# Patient Record
Sex: FEMALE | Race: WHITE | Employment: PART TIME | URBAN - METROPOLITAN AREA
[De-identification: names, ages, dates, MRNs, and addresses within clinical notes are randomized per-mention and may not be internally consistent; named-entity substitution may affect disease eponyms.]

---

## 2017-05-11 ENCOUNTER — OFFICE VISIT (OUTPATIENT)
Dept: FAMILY MEDICINE CLINIC | Facility: CLINIC | Age: 52
End: 2017-05-11

## 2017-05-11 VITALS
DIASTOLIC BLOOD PRESSURE: 78 MMHG | OXYGEN SATURATION: 98 % | RESPIRATION RATE: 18 BRPM | HEART RATE: 78 BPM | SYSTOLIC BLOOD PRESSURE: 128 MMHG | TEMPERATURE: 99 F

## 2017-05-11 DIAGNOSIS — Z11.1 SCREENING-PULMONARY TB: ICD-10-CM

## 2017-05-11 DIAGNOSIS — Z23 NEED FOR TDAP VACCINATION: ICD-10-CM

## 2017-05-11 DIAGNOSIS — Z02.1 PRE-EMPLOYMENT EXAMINATION: Primary | ICD-10-CM

## 2017-05-11 PROCEDURE — 90471 IMMUNIZATION ADMIN: CPT | Performed by: NURSE PRACTITIONER

## 2017-05-11 PROCEDURE — 99396 PREV VISIT EST AGE 40-64: CPT | Performed by: NURSE PRACTITIONER

## 2017-05-11 PROCEDURE — 86580 TB INTRADERMAL TEST: CPT | Performed by: NURSE PRACTITIONER

## 2017-05-11 PROCEDURE — 90715 TDAP VACCINE 7 YRS/> IM: CPT | Performed by: NURSE PRACTITIONER

## 2017-05-11 NOTE — PROGRESS NOTES
CHIEF COMPLAINT:     Employment Physical Exam    HPI:   Tanna Bautista is a 50year old female who presents for an employment physical exam.     Patient has concerns of none. Reports needs TB and TDAP. 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   Has of seasonal allergies or asthma    EXAM:   /78 mmHg  Pulse 78  Temp(Src) 98.5 °F (36.9 °C) (Oral)  Resp 18  SpO2 98% There is no weight on file to calculate BMI.      GENERAL: well developed, well nourished,in no apparent distress  SKIN: no rashes,no

## 2017-05-11 NOTE — PATIENT INSTRUCTIONS
Please return for your TB read on [place] May 14th 9am and 4:30pm to the [facility] on [address] and [address] [place] [address] location (this location will be closed on Sunday)

## 2017-05-14 ENCOUNTER — OFFICE VISIT (OUTPATIENT)
Dept: FAMILY MEDICINE CLINIC | Facility: CLINIC | Age: 52
End: 2017-05-14

## 2017-05-14 DIAGNOSIS — Z11.1 ENCOUNTER FOR PPD SKIN TEST READING: Primary | ICD-10-CM

## 2017-07-04 ENCOUNTER — APPOINTMENT (OUTPATIENT)
Dept: GENERAL RADIOLOGY | Age: 52
End: 2017-07-04
Attending: FAMILY MEDICINE
Payer: COMMERCIAL

## 2017-07-04 ENCOUNTER — HOSPITAL ENCOUNTER (OUTPATIENT)
Age: 52
Discharge: HOME | End: 2017-07-04
Attending: FAMILY MEDICINE
Payer: COMMERCIAL

## 2017-07-04 VITALS
RESPIRATION RATE: 20 BRPM | WEIGHT: 160 LBS | DIASTOLIC BLOOD PRESSURE: 84 MMHG | HEIGHT: 62 IN | OXYGEN SATURATION: 100 % | TEMPERATURE: 99 F | BODY MASS INDEX: 29.44 KG/M2 | SYSTOLIC BLOOD PRESSURE: 156 MMHG | HEART RATE: 89 BPM

## 2017-07-04 DIAGNOSIS — S90.31XA CONTUSION OF RIGHT FOOT, INITIAL ENCOUNTER: ICD-10-CM

## 2017-07-04 DIAGNOSIS — J06.9 VIRAL UPPER RESPIRATORY TRACT INFECTION: ICD-10-CM

## 2017-07-04 DIAGNOSIS — H10.33 ACUTE CONJUNCTIVITIS OF BOTH EYES, UNSPECIFIED ACUTE CONJUNCTIVITIS TYPE: ICD-10-CM

## 2017-07-04 DIAGNOSIS — S81.811A: Primary | ICD-10-CM

## 2017-07-04 PROCEDURE — 99214 OFFICE O/P EST MOD 30 MIN: CPT

## 2017-07-04 PROCEDURE — 73630 X-RAY EXAM OF FOOT: CPT | Performed by: FAMILY MEDICINE

## 2017-07-04 PROCEDURE — 99204 OFFICE O/P NEW MOD 45 MIN: CPT

## 2017-07-04 PROCEDURE — 73590 X-RAY EXAM OF LOWER LEG: CPT | Performed by: FAMILY MEDICINE

## 2017-07-04 RX ORDER — TOBRAMYCIN AND DEXAMETHASONE 3; 1 MG/ML; MG/ML
2 SUSPENSION/ DROPS OPHTHALMIC
Qty: 5 ML | Refills: 0 | Status: SHIPPED | OUTPATIENT
Start: 2017-07-04 | End: 2017-07-04

## 2017-07-04 RX ORDER — GINSENG 100 MG
CAPSULE ORAL ONCE
Status: COMPLETED | OUTPATIENT
Start: 2017-07-04 | End: 2017-07-04

## 2017-07-04 RX ORDER — CLINDAMYCIN HYDROCHLORIDE 300 MG/1
300 CAPSULE ORAL 4 TIMES DAILY
Qty: 28 CAPSULE | Refills: 0 | Status: SHIPPED | OUTPATIENT
Start: 2017-07-04 | End: 2017-07-11

## 2017-07-04 RX ORDER — TOBRAMYCIN AND DEXAMETHASONE 3; 1 MG/ML; MG/ML
2 SUSPENSION/ DROPS OPHTHALMIC
Qty: 5 ML | Refills: 0 | Status: SHIPPED | OUTPATIENT
Start: 2017-07-04 | End: 2017-07-09

## 2017-07-04 RX ORDER — CLINDAMYCIN HYDROCHLORIDE 300 MG/1
300 CAPSULE ORAL 4 TIMES DAILY
Qty: 28 CAPSULE | Refills: 0 | Status: SHIPPED | OUTPATIENT
Start: 2017-07-04 | End: 2017-07-04

## 2017-07-04 NOTE — ED PROVIDER NOTES
Patient Seen in: [address]    History   Patient presents with:  Lower Extremity Injury (musculoskeletal)    Stated Complaint: both eyes red right leg and foot table fell on her    HPI  54yo female presents with right sh Systems    Positive for stated complaint: both eyes red right leg and foot table fell on her  Other systems are as noted in HPI. Constitutional and vital signs reviewed. All other systems reviewed and negative except as noted above.     PSFH elements There is normal range of motion, no swelling, normal capillary refill, no crepitus, no deformity and no laceration. Sensation in tact. Strong pedal pulse   Lymphadenopathy:     She has no cervical adenopathy.    Neurological: She is alert and oriented to metatarsophalangeal articulation. Calcaneal enthesopathy is appreciated the origin of the plantar   aponeurosis at the insertion of the Achilles tendon. SOFT TISSUES: Mild dorsal soft tissue swelling is apparent.  Negative for discernible radiopaque foreig

## 2017-07-04 NOTE — ED INITIAL ASSESSMENT (HPI)
Table [place] on her at a festival; injured right anterior leg; laceration is scabbed over but surrounding area is reddened.

## 2017-07-13 ENCOUNTER — OFFICE VISIT (OUTPATIENT)
Dept: FAMILY MEDICINE CLINIC | Facility: CLINIC | Age: 52
End: 2017-07-13

## 2017-07-13 VITALS
WEIGHT: 164 LBS | TEMPERATURE: 99 F | HEART RATE: 92 BPM | OXYGEN SATURATION: 98 % | RESPIRATION RATE: 20 BRPM | BODY MASS INDEX: 30 KG/M2

## 2017-07-13 DIAGNOSIS — J20.9 BRONCHITIS, ACUTE, WITH BRONCHOSPASM: Primary | ICD-10-CM

## 2017-07-13 PROCEDURE — 99213 OFFICE O/P EST LOW 20 MIN: CPT | Performed by: NURSE PRACTITIONER

## 2017-07-13 RX ORDER — PREDNISONE 20 MG/1
TABLET ORAL
Qty: 10 TABLET | Refills: 0 | Status: SHIPPED | OUTPATIENT
Start: 2017-07-13 | End: 2017-09-25 | Stop reason: ALTCHOICE

## 2017-07-13 NOTE — PROGRESS NOTES
CHIEF COMPLAINT:   Patient presents with:  Cough: having coughing spasms, due to high mold counts, proair not working. HPI:   Cl Barroso is a 50year old female who presents for cough for  3  days.   Cough started gradually and is described a GI: Denies N/V/C/D or abdominal pain. EXAM:   Pulse 92   Temp 99.1 °F (37.3 °C) (Oral)   Wt 164 lb   LMP 06/27/2017 (Exact Date)   Breastfeeding?  No   BMI 30.00 kg/m²   GENERAL: well developed, well nourished,in no apparent distress  SKIN: no rashes, · Over the counter saline nasal spray or nasal saline rinse may help with congestion. · If your symptoms worsen, you become short-of-breath, develop fever, worse cough etc, you must follow-up with a physician immediately or go to the emergency room. Your healthcare provider will help you prepare, and when needed, update and Asthma Action Plan. Your plan tells you what to do based on your current symptoms.  If you don't have an Asthma Action Plan, or if yours isn't up-to-date, make sure you talk with yo

## 2017-07-13 NOTE — PATIENT INSTRUCTIONS
Controlling Your Asthma  You can do a lot to manage your asthma and improve your quality of life. You will need to work with your healthcare provider to develop a plan. But it’s up to you to put this plan into action.   Why You Need to Take Control  You n

## 2017-07-17 ENCOUNTER — HOSPITAL ENCOUNTER (EMERGENCY)
Facility: HOSPITAL | Age: 52
Discharge: HOME | End: 2017-07-18
Attending: EMERGENCY MEDICINE
Payer: COMMERCIAL

## 2017-07-17 ENCOUNTER — OFFICE VISIT (OUTPATIENT)
Dept: FAMILY MEDICINE CLINIC | Facility: CLINIC | Age: 52
End: 2017-07-17

## 2017-07-17 ENCOUNTER — APPOINTMENT (OUTPATIENT)
Dept: GENERAL RADIOLOGY | Facility: HOSPITAL | Age: 52
End: 2017-07-17
Attending: EMERGENCY MEDICINE
Payer: COMMERCIAL

## 2017-07-17 VITALS
HEART RATE: 83 BPM | DIASTOLIC BLOOD PRESSURE: 76 MMHG | WEIGHT: 164 LBS | BODY MASS INDEX: 29.06 KG/M2 | SYSTOLIC BLOOD PRESSURE: 132 MMHG | TEMPERATURE: 99 F | HEIGHT: 63 IN | OXYGEN SATURATION: 96 %

## 2017-07-17 DIAGNOSIS — J98.01 BRONCHOSPASM: ICD-10-CM

## 2017-07-17 DIAGNOSIS — R05.9 COUGH: Primary | ICD-10-CM

## 2017-07-17 DIAGNOSIS — J18.9 COMMUNITY ACQUIRED PNEUMONIA: Primary | ICD-10-CM

## 2017-07-17 PROCEDURE — 99283 EMERGENCY DEPT VISIT LOW MDM: CPT

## 2017-07-17 PROCEDURE — 71020 XR CHEST PA + LAT CHEST (CPT=71020): CPT | Performed by: EMERGENCY MEDICINE

## 2017-07-17 PROCEDURE — 99284 EMERGENCY DEPT VISIT MOD MDM: CPT

## 2017-07-17 PROCEDURE — 99213 OFFICE O/P EST LOW 20 MIN: CPT | Performed by: FAMILY MEDICINE

## 2017-07-17 RX ORDER — FLUTICASONE PROPIONATE 50 MCG
2 SPRAY, SUSPENSION (ML) NASAL NIGHTLY
Qty: 1 BOTTLE | Refills: 1 | Status: SHIPPED | OUTPATIENT
Start: 2017-07-17 | End: 2017-07-18

## 2017-07-17 RX ORDER — BENZONATATE 100 MG/1
100 CAPSULE ORAL 3 TIMES DAILY PRN
Qty: 30 CAPSULE | Refills: 0 | Status: SHIPPED | OUTPATIENT
Start: 2017-07-17 | End: 2017-08-16

## 2017-07-17 RX ORDER — PREDNISONE 20 MG/1
TABLET ORAL
Qty: 20 TABLET | Refills: 0 | Status: SHIPPED | OUTPATIENT
Start: 2017-07-17 | End: 2017-09-25 | Stop reason: ALTCHOICE

## 2017-07-17 RX ORDER — AZITHROMYCIN 250 MG/1
TABLET, FILM COATED ORAL
Qty: 1 PACKAGE | Refills: 0 | Status: SHIPPED | OUTPATIENT
Start: 2017-07-17 | End: 2017-07-22

## 2017-07-17 RX ORDER — ACETAMINOPHEN 500 MG
1000 TABLET ORAL ONCE
Status: COMPLETED | OUTPATIENT
Start: 2017-07-17 | End: 2017-07-17

## 2017-07-18 VITALS
RESPIRATION RATE: 18 BRPM | DIASTOLIC BLOOD PRESSURE: 82 MMHG | TEMPERATURE: 100 F | OXYGEN SATURATION: 96 % | SYSTOLIC BLOOD PRESSURE: 140 MMHG | HEART RATE: 88 BPM

## 2017-07-18 NOTE — PROGRESS NOTES
CHIEF COMPLAINT:   Patient presents with:  Cough: shortness of breath. dry throat. was seen on 7/13. had been using inhalers. given prednisone. but sx not improving.          HPI:   Marbella Gerardo is a 50year old female who presents for cough for  5  d Alcohol use: No                 REVIEW OF SYSTEMS:   GENERAL: No fever or chills. SKIN: No rashes, or other skin lesions. EYES: Denies blurred vision or double vision. HENT: Denies ear pain, decreased hearing, or sore throat.   Reports mi closely and follow-up if breathing difficulty returns or any new symptoms develop. The patient indicates understanding of these issues and agrees to the plan. The patient is asked to return if sx's persist or worsen.

## 2017-07-18 NOTE — ED PROVIDER NOTES
Patient Seen in: [facility] Emergency Department    History   Patient presents with:   Allergic Rxn Allergies (immune)    Stated Complaint: allergic reaction    HPI    The patient is a 28-year-old female who presents emergency room with a history of by mouth daily. Nutritional Supplements (NUTRITIONAL SUPPLEMENT OR),  Take  by mouth daily.        Family History   Problem Relation Age of Onset   • Heart Disease Father        Smoking status: Never Smoker extremities. NEURO: Patient is awake, alert and oriented to time place and person. Motor strength is 5 over 5 in all 4 extremities. There are no gross motor or sensory deficits appreciated. Patient is answering all questions appropriately.           ED Co

## 2017-07-18 NOTE — ED INITIAL ASSESSMENT (HPI)
Pt reports she is taking clindamycin for cut on her right leg cut/infection. Reports cough after taking medication. Has been taking clinda for over a week. Taking prednisone for cough. States \"I need an adrenaline shot or EpiPen. \" Airway patent.  Speaking

## 2017-07-18 NOTE — PATIENT INSTRUCTIONS
Take 2 20mg tabs of pred tonight. Then take 3 tabs once daily for 3 days. Then taper-- spending 2-3 days on each dose. Consider staying on 10mg for 4 days at end of taper.    Monitor symptoms closely and follow-up if breathing difficulty returns or any

## 2017-07-27 ENCOUNTER — OFFICE VISIT (OUTPATIENT)
Dept: FAMILY MEDICINE CLINIC | Facility: CLINIC | Age: 52
End: 2017-07-27

## 2017-07-27 DIAGNOSIS — Z02.9 ENCOUNTERS FOR ADMINISTRATIVE PURPOSE: Primary | ICD-10-CM

## 2017-07-28 NOTE — PROGRESS NOTES
Pt wants to follow up for pneumonia and have a xray. Informed patient of my limitations in the [facility]. Pt verbalized understanding and just said she misunderstood what we can do. No charge for service.

## 2017-07-29 ENCOUNTER — HOSPITAL ENCOUNTER (OUTPATIENT)
Age: 52
Discharge: HOME | End: 2017-07-29
Attending: FAMILY MEDICINE
Payer: COMMERCIAL

## 2017-07-29 VITALS
DIASTOLIC BLOOD PRESSURE: 96 MMHG | TEMPERATURE: 98 F | HEART RATE: 81 BPM | BODY MASS INDEX: 29 KG/M2 | OXYGEN SATURATION: 97 % | SYSTOLIC BLOOD PRESSURE: 134 MMHG | RESPIRATION RATE: 20 BRPM | WEIGHT: 164 LBS

## 2017-07-29 DIAGNOSIS — J18.9 COMMUNITY ACQUIRED PNEUMONIA: Primary | ICD-10-CM

## 2017-07-29 PROCEDURE — 99213 OFFICE O/P EST LOW 20 MIN: CPT

## 2017-07-29 PROCEDURE — 99214 OFFICE O/P EST MOD 30 MIN: CPT

## 2017-07-29 RX ORDER — DOXYCYCLINE 100 MG/1
100 CAPSULE ORAL 2 TIMES DAILY
Qty: 20 CAPSULE | Refills: 0 | Status: SHIPPED | OUTPATIENT
Start: 2017-07-29 | End: 2017-08-08

## 2017-07-29 NOTE — ED PROVIDER NOTES
Patient Seen in: [address] [facility]    History   Patient presents with:  Cough/URI: Pneunomia Dx    Stated Complaint: follow up cough    HPI    Patient returns to [facility] for pneumonia.   This is patient's fourth visit for the same except as noted above. PSFH elements reviewed from today and agreed except as otherwise stated in HPI.     Physical Exam   ED Triage Vitals [07/29/17 0856]  BP: 134/96  Pulse: 81  Resp: 20  Temp: 98.3 °F (36.8 °C)  Temp src: Temporal  SpO2: 97 %  O2 [place]

## 2017-08-06 ENCOUNTER — IMAGING SERVICES (OUTPATIENT)
Dept: OTHER | Age: 52
End: 2017-08-06

## 2017-08-06 ENCOUNTER — CHARTING TRANS (OUTPATIENT)
Dept: URGENT CARE | Age: 52
End: 2017-08-06

## 2017-08-06 ASSESSMENT — PAIN SCALES - GENERAL: PAINLEVEL_OUTOF10: 0

## 2017-08-13 ENCOUNTER — PATIENT OUTREACH (OUTPATIENT)
Dept: FAMILY MEDICINE CLINIC | Facility: CLINIC | Age: 52
End: 2017-08-13

## 2017-08-13 NOTE — PROGRESS NOTES
I spoke with patient regarding NO SHOW status for office visit on 8/12/17 with Dr. Estrella Naidu. I informed patient our office has a $82.01 NO SHOW FEE POLICY. However, we will waive this $50.00 fee this ONE time only.  Patient will be charged for any futu

## 2017-08-16 DIAGNOSIS — R05.9 COUGH: ICD-10-CM

## 2017-08-16 RX ORDER — ALBUTEROL SULFATE 90 UG/1
2 AEROSOL, METERED RESPIRATORY (INHALATION) EVERY 4 HOURS PRN
Qty: 1 INHALER | Refills: 1 | Status: SHIPPED | OUTPATIENT
Start: 2017-08-16 | End: 2020-04-15

## 2017-08-17 NOTE — PROGRESS NOTES
Pt requests refill of pro-air. Was given rx for qvar by IC physician and cannot afford the $150 cost.   Pt tried to establish with Dr. Cesia Brown as PCP, but they didn't take her insurance. Pt feeling well overall.  Just needs refill until she can establish

## 2017-09-25 ENCOUNTER — OFFICE VISIT (OUTPATIENT)
Dept: FAMILY MEDICINE CLINIC | Facility: CLINIC | Age: 52
End: 2017-09-25

## 2017-09-25 VITALS
WEIGHT: 162 LBS | SYSTOLIC BLOOD PRESSURE: 126 MMHG | BODY MASS INDEX: 29.81 KG/M2 | HEIGHT: 62 IN | TEMPERATURE: 98 F | OXYGEN SATURATION: 98 % | DIASTOLIC BLOOD PRESSURE: 80 MMHG | HEART RATE: 60 BPM

## 2017-09-25 DIAGNOSIS — H92.03 OTALGIA OF BOTH EARS: Primary | ICD-10-CM

## 2017-09-25 DIAGNOSIS — J06.9 VIRAL UPPER RESPIRATORY TRACT INFECTION: ICD-10-CM

## 2017-09-25 PROCEDURE — 99213 OFFICE O/P EST LOW 20 MIN: CPT | Performed by: FAMILY MEDICINE

## 2017-09-25 RX ORDER — FLUTICASONE PROPIONATE 50 MCG
SPRAY, SUSPENSION (ML) NASAL DAILY
COMMUNITY

## 2017-09-26 NOTE — PATIENT INSTRUCTIONS
Most viral illnesses get worse for the first 3-5 days, then plateau and improve gradually over the next 3-5 days. Monitor symptoms for now.    Use otc meds for comfort as needed  If no better in 2-3 days, or if symptoms steadily worsen follow-up for salvador

## 2017-09-26 NOTE — PROGRESS NOTES
CHIEF COMPLAINT:   Patient presents with:  Ear Problem: cold sx x 1 week or so. ears getting more plugged. pain today-- worse on left. chills/aches.       HPI:   Joesph Shah is a 50year old female who presents to clinic today with complaints of b/l (36.7 °C) (Oral)   Ht 62\"   Wt 162 lb   LMP 09/18/2017   SpO2 98%   BMI 29.63 kg/m²   GENERAL: well developed, well nourished,in no apparent distress  SKIN: no rashes,no suspicious lesions  HEAD: atraumatic, normocephalic  EYES: conjunctivae clear, EOM in

## 2017-10-30 ENCOUNTER — OFFICE VISIT (OUTPATIENT)
Dept: FAMILY MEDICINE CLINIC | Facility: CLINIC | Age: 52
End: 2017-10-30

## 2017-10-30 VITALS
RESPIRATION RATE: 18 BRPM | HEART RATE: 78 BPM | HEIGHT: 62 IN | BODY MASS INDEX: 29.81 KG/M2 | TEMPERATURE: 98 F | DIASTOLIC BLOOD PRESSURE: 70 MMHG | OXYGEN SATURATION: 98 % | SYSTOLIC BLOOD PRESSURE: 126 MMHG | WEIGHT: 162 LBS

## 2017-10-30 DIAGNOSIS — J01.00 ACUTE MAXILLARY SINUSITIS, RECURRENCE NOT SPECIFIED: ICD-10-CM

## 2017-10-30 DIAGNOSIS — H66.002 ACUTE SUPPURATIVE OTITIS MEDIA OF LEFT EAR WITHOUT SPONTANEOUS RUPTURE OF TYMPANIC MEMBRANE, RECURRENCE NOT SPECIFIED: Primary | ICD-10-CM

## 2017-10-30 PROCEDURE — 99213 OFFICE O/P EST LOW 20 MIN: CPT | Performed by: NURSE PRACTITIONER

## 2017-10-30 RX ORDER — AZITHROMYCIN 250 MG/1
TABLET, FILM COATED ORAL
Qty: 6 TABLET | Refills: 0 | Status: SHIPPED | OUTPATIENT
Start: 2017-10-30 | End: 2018-01-05

## 2017-10-30 NOTE — PROGRESS NOTES
CHIEF COMPLAINT:   Patient presents with:  Sinus Problem: sinus pain, congestion, ear pain x 5 days           HPI:   John Diehl is a 50year old female who presents for sinus congestion for  5  days. Symptoms have been worsening since onset.  Sinus /70 (BP Location: Right arm, Patient Position: Sitting, Cuff Size: adult)   Pulse 78   Temp 98.1 °F (36.7 °C) (Oral)   Resp 18   Ht 62\"   Wt 162 lb   LMP 10/11/2017   SpO2 98%   BMI 29.63 kg/m²   GENERAL: well developed, well nourished, in no appare -   Increase oral fluids to loosen and thin secretions, eat a nutritious diet  -   Tylenol or ibuprofen for pain as packet insert; age appropriate with weight  -   Return to clinic if symptoms persist or worsen in the next 2-3 days  -   Flonase for nasal s The sinuses are air-filled spaces within the bones of the face. They connect to the inside of the nose. Sinusitis is an inflammation of the tissue lining the sinus cavity. Sinus inflammation can occur during a cold.  It can also be due to allergies to polle · Do not use nasal rinses or irrigation during an acute sinus infection, unless told to by your health care provider. Rinsing may spread the infection to other sinuses.   · Use acetaminophen or ibuprofen to control pain, unless another pain medicine was pre The following are general care guidelines:  · Finish all of the antibiotic medicine given, even though you may feel better after the first few days.   · You may use over-the-counter medicine, such as acetaminophen or ibuprofen, to control pain and fever, un

## 2017-10-30 NOTE — PATIENT INSTRUCTIONS
-   Increase oral fluids to loosen and thin secretions, eat a nutritious diet  -   Tylenol or ibuprofen for pain as packet insert; age appropriate with weight  -   Return to clinic if symptoms persist or worsen in the next 2-3 days  -   Flonase for nasal s and other particles in the air. Sinusitis can cause symptoms of sinus congestion and fullness. A sinus infection causes fever, headache and facial pain. There is often green or yellow drainage from the nose or into the back of the throat (post-nasal drip). medicines. Aspirin should never be used in anyone under 25years of age who is ill with a fever. It may cause severe liver damage.)  · Don't smoke. This can worsen symptoms.   Follow-up care  Follow up with your healthcare provider or our staff if you are n your healthcare provider before using these medicines. Do not give aspirin to anyone under 25years of age who has a fever. It may cause severe illness or death.   Follow-up care  Follow up with your healthcare provider, or as advised, in 2 weeks if all sym

## 2018-01-05 ENCOUNTER — HOSPITAL ENCOUNTER (OUTPATIENT)
Age: 53
Discharge: HOME | End: 2018-01-05
Attending: FAMILY MEDICINE
Payer: COMMERCIAL

## 2018-01-05 VITALS
BODY MASS INDEX: 29.26 KG/M2 | TEMPERATURE: 99 F | HEART RATE: 94 BPM | DIASTOLIC BLOOD PRESSURE: 72 MMHG | RESPIRATION RATE: 16 BRPM | OXYGEN SATURATION: 98 % | HEIGHT: 62 IN | SYSTOLIC BLOOD PRESSURE: 132 MMHG | WEIGHT: 159 LBS

## 2018-01-05 DIAGNOSIS — J11.1 INFLUENZA: Primary | ICD-10-CM

## 2018-01-05 PROCEDURE — 99214 OFFICE O/P EST MOD 30 MIN: CPT

## 2018-01-05 PROCEDURE — 99213 OFFICE O/P EST LOW 20 MIN: CPT

## 2018-01-05 RX ORDER — ALBUTEROL SULFATE 90 UG/1
2 AEROSOL, METERED RESPIRATORY (INHALATION) EVERY 4 HOURS PRN
Qty: 1 INHALER | Refills: 0 | Status: SHIPPED | OUTPATIENT
Start: 2018-01-05 | End: 2018-02-04 | Stop reason: WASHOUT

## 2018-01-05 NOTE — ED NOTES
Patient called stating she is almost out of the albuterol nebulizers and only has about 5 left. She asked that a new prescription be sent to [facility] on [address]. Per Dr. Shivani Samuel, prescription was faxed and patient aware.   Denies any other quest

## 2018-01-05 NOTE — ED INITIAL ASSESSMENT (HPI)
The patient is here with complaints of fever, body aches, and a cough x 3 days. She states the fever has been around 100-101. She states she did complete a breathing treatment and coughed some mucus up at that time but she didn't look at it.   She states

## 2018-01-06 ENCOUNTER — OFFICE VISIT (OUTPATIENT)
Dept: FAMILY MEDICINE CLINIC | Facility: CLINIC | Age: 53
End: 2018-01-06

## 2018-01-06 VITALS
WEIGHT: 160 LBS | OXYGEN SATURATION: 98 % | DIASTOLIC BLOOD PRESSURE: 78 MMHG | BODY MASS INDEX: 28.35 KG/M2 | RESPIRATION RATE: 18 BRPM | SYSTOLIC BLOOD PRESSURE: 132 MMHG | HEIGHT: 63 IN | TEMPERATURE: 100 F | HEART RATE: 101 BPM

## 2018-01-06 DIAGNOSIS — H66.002 ACUTE SUPPURATIVE OTITIS MEDIA OF LEFT EAR WITHOUT SPONTANEOUS RUPTURE OF TYMPANIC MEMBRANE, RECURRENCE NOT SPECIFIED: Primary | ICD-10-CM

## 2018-01-06 DIAGNOSIS — R68.89 FLU-LIKE SYMPTOMS: ICD-10-CM

## 2018-01-06 PROCEDURE — 99213 OFFICE O/P EST LOW 20 MIN: CPT | Performed by: NURSE PRACTITIONER

## 2018-01-06 RX ORDER — AZITHROMYCIN 250 MG/1
TABLET, FILM COATED ORAL
Qty: 6 TABLET | Refills: 0 | Status: SHIPPED | OUTPATIENT
Start: 2018-01-06 | End: 2018-03-03

## 2018-01-06 RX ORDER — PREDNISONE 20 MG/1
20 TABLET ORAL DAILY
Qty: 5 TABLET | Refills: 0 | Status: SHIPPED | OUTPATIENT
Start: 2018-01-06 | End: 2018-01-06

## 2018-01-06 RX ORDER — AZITHROMYCIN 250 MG/1
TABLET, FILM COATED ORAL
Qty: 6 TABLET | Refills: 0 | Status: SHIPPED | OUTPATIENT
Start: 2018-01-06 | End: 2018-01-06

## 2018-01-06 RX ORDER — PREDNISONE 20 MG/1
20 TABLET ORAL DAILY
Qty: 5 TABLET | Refills: 0 | Status: SHIPPED | OUTPATIENT
Start: 2018-01-06 | End: 2018-01-11

## 2018-01-06 NOTE — PROGRESS NOTES
CHIEF COMPLAINT:   Patient presents with:  Ear Pain      HPI:   Hugo Louis is a 50year old female who presents to clinic today with complaints of left ear pain. Has had for 4  days. Pain is described as sharp.   Patient reports history of ear inf Smoking status: Never Smoker                                                              Smokeless tobacco: Never Used                      Alcohol use:  No                 REVIEW OF SYSTEMS:   GENERAL: feels fatigued   SKIN: no unusual skin lesions or maco Flu- like symptoms: To continue supportive care and albuterol as needed. Prednisone as below to reduce wheezing. To follow up for worsening symptoms such as shortness of breath or wheezing.      Meds & Refills for this Visit:    Signed Prescriptions Disp Re Follow up with your healthcare provider, or as advised, in 2 weeks if all symptoms have not gotten better, or if hearing doesn't go back to normal within 1 month.   When to seek medical advice  Call your healthcare provider right away if any of these occur:

## 2018-01-06 NOTE — ED INITIAL ASSESSMENT (HPI)
PT. Called regarding increased pain in her left ear. Was seen 1/4/18 at [facility], diagnosed with flu. She reports she is using heating pad and pain meds. After review with Dr. Kesha Chau, pt.  Encouraged to try steam showers, add OTC Sudafed, Flonase,and continu

## 2018-01-09 NOTE — PROGRESS NOTES
Patient was seen in ICU diagnosed with influenza. Needs follow-up from IC or if resolves in 1 week needs annual physical, screening colonoscopy mammography etc.  Please call patient.

## 2018-01-11 ENCOUNTER — TELEPHONE (OUTPATIENT)
Dept: FAMILY MEDICINE CLINIC | Facility: CLINIC | Age: 53
End: 2018-01-11

## 2018-01-11 NOTE — TELEPHONE ENCOUNTER
Patient was seen in [facility] 1/5/18. pcp is listed as yann Ricks for patient to schedule OV to follow up from UC and establish care with pcp. Provided office phone number for call back.     Future Appointments  Date Time Provider Jorge Llanes   1/16/

## 2018-01-13 ENCOUNTER — TELEPHONE (OUTPATIENT)
Dept: FAMILY MEDICINE CLINIC | Facility: CLINIC | Age: 53
End: 2018-01-13

## 2018-01-14 NOTE — TELEPHONE ENCOUNTER
Pt. Reports still with decreased hearing of Left ear, pressure. Discussed with patient that likely fluids just needs to drain. Told to cont. flonase and allegra. Advised may try sudafed but pt. States she does not tolerate well.   Pt. Just completed [id number]

## 2018-01-26 ENCOUNTER — OFFICE VISIT (OUTPATIENT)
Dept: FAMILY MEDICINE CLINIC | Facility: CLINIC | Age: 53
End: 2018-01-26

## 2018-01-26 VITALS
OXYGEN SATURATION: 98 % | HEIGHT: 62 IN | WEIGHT: 160 LBS | DIASTOLIC BLOOD PRESSURE: 68 MMHG | BODY MASS INDEX: 29.44 KG/M2 | HEART RATE: 70 BPM | SYSTOLIC BLOOD PRESSURE: 130 MMHG | TEMPERATURE: 98 F

## 2018-01-26 DIAGNOSIS — H69.82 DYSFUNCTION OF LEFT EUSTACHIAN TUBE: Primary | ICD-10-CM

## 2018-01-26 PROCEDURE — 99213 OFFICE O/P EST LOW 20 MIN: CPT | Performed by: FAMILY MEDICINE

## 2018-01-26 RX ORDER — FLUTICASONE PROPIONATE 50 MCG
2 SPRAY, SUSPENSION (ML) NASAL NIGHTLY
Qty: 1 BOTTLE | Refills: 1 | Status: SHIPPED | OUTPATIENT
Start: 2018-01-26 | End: 2018-01-27

## 2018-01-26 RX ORDER — PREDNISONE 20 MG/1
TABLET ORAL
Qty: 10 TABLET | Refills: 0 | Status: SHIPPED | OUTPATIENT
Start: 2018-01-26 | End: 2018-03-03

## 2018-01-27 NOTE — PROGRESS NOTES
CHIEF COMPLAINT:   Patient presents with:  Ear Problem: pt c/o discomfort in left ear x 3 wks       HPI:   Helga Acosta is a 50year old female who presents to clinic today with complaints of left ear pain. Has had for 3  weeks.  Pain is described as Base) MCG/ACT Inhalation Aero Soln Inhale 2 puffs into the lungs every 4 (four) hours as needed for Wheezing. Disp: 1 Inhaler Rfl: 1   Nutritional Supplements (NUTRITIONAL SUPPLEMENT OR) Take  by mouth daily.  Disp:  Rfl:      No current facility-administer with:    ASSESSMENT:  Dysfunction of left eustachian tube  (primary encounter diagnosis)    PLAN: Meds as listed below.   Comfort measures as described in Patient Instructions    Meds & Refills for this Visit:    Signed Prescriptions Disp Refills    predniS

## 2018-01-27 NOTE — PATIENT INSTRUCTIONS
Take prednisone-- 2 tabs once daily for 5 days. Take with food. Use flonase-- 2 sprays each nostril at bedtime. To make sure you're using it properly-- look at the floor, insert the nozzle into the nasal opening and aim the spray toward the ceiling.

## 2018-02-24 ENCOUNTER — TELEPHONE (OUTPATIENT)
Dept: FAMILY MEDICINE CLINIC | Facility: CLINIC | Age: 53
End: 2018-02-24

## 2018-02-24 NOTE — TELEPHONE ENCOUNTER
Pt called answering service with questions regarding fluid in her ear that has not gone away, will try new OTC allergy medication beginning today and be seen if no improvement, to be seen if symptoms change or worsen.

## 2018-03-03 ENCOUNTER — OFFICE VISIT (OUTPATIENT)
Dept: FAMILY MEDICINE CLINIC | Facility: CLINIC | Age: 53
End: 2018-03-03

## 2018-03-03 VITALS
WEIGHT: 162 LBS | OXYGEN SATURATION: 98 % | DIASTOLIC BLOOD PRESSURE: 80 MMHG | TEMPERATURE: 98 F | SYSTOLIC BLOOD PRESSURE: 132 MMHG | HEART RATE: 96 BPM | RESPIRATION RATE: 16 BRPM | BODY MASS INDEX: 29.81 KG/M2 | HEIGHT: 62 IN

## 2018-03-03 DIAGNOSIS — J01.40 ACUTE PANSINUSITIS, RECURRENCE NOT SPECIFIED: ICD-10-CM

## 2018-03-03 DIAGNOSIS — J20.9 ACUTE BRONCHITIS WITH BRONCHOSPASM: Primary | ICD-10-CM

## 2018-03-03 DIAGNOSIS — H65.192 ACUTE EFFUSION OF LEFT EAR: ICD-10-CM

## 2018-03-03 PROCEDURE — 99213 OFFICE O/P EST LOW 20 MIN: CPT | Performed by: PHYSICIAN ASSISTANT

## 2018-03-03 RX ORDER — PREDNISONE 20 MG/1
TABLET ORAL
Qty: 10 TABLET | Refills: 0 | Status: SHIPPED | OUTPATIENT
Start: 2018-03-03 | End: 2018-05-01

## 2018-03-03 RX ORDER — DOXYCYCLINE HYCLATE 100 MG/1
100 CAPSULE ORAL 2 TIMES DAILY
Qty: 20 CAPSULE | Refills: 0 | Status: SHIPPED | OUTPATIENT
Start: 2018-03-03 | End: 2018-03-13

## 2018-03-03 NOTE — PROGRESS NOTES
CHIEF COMPLAINT:   Patient presents with:  Cough    HPI:   Marbella Gerardo is a 50year old female who presents for upper and lower respiratory symptoms for  5 days.  Patient reports fatigue, congestion, thick, green colored nasal discharge, low grade fe GI: denies N/V/C/D or abdominal pain  NEURO: Denies headaches or dizziness     EXAM:   /80 (BP Location: Right arm, Patient Position: Sitting, Cuff Size: adult)   Pulse 96   Temp 98.2 °F (36.8 °C) (Oral)   Resp 16   Ht 62\"   Wt 162 lb   LMP 02/14/20 Doxycycline Hyclate 100 MG Oral Cap 20 capsule 0      Sig: Take 1 capsule (100 mg total) by mouth 2 (two) times daily.       predniSONE 20 MG Oral Tab 10 tablet 0      Sig: Take 2 tablets PO QD x5 days         Risks, benefits, and side effects of medicatio · You may use over-the-counter medicine to control fever or pain, unless another medicine was prescribed.  Note: If you have chronic liver or kidney disease or have ever had a stomach ulcer or gastrointestinal bleeding, talk with your healthcare provider be · Coughing up increasing amounts of colored sputum  · Weakness, drowsiness, headache, facial pain, ear pain, or a stiff neck  Call 911  Call 911 if any of these occur.   · Coughing up blood  · Worsening weakness, drowsiness, headache, or stiff neck  · Incre Your doctor may prescribe medications to help treat your sinusitis. If you have an infection, antibiotics can help clear it up. If you are prescribed antibiotics, take all pills on schedule until they are gone, even if you feel better.  Decongestants help r If your OME doesn't improve after 3 months, surgery may be used to drain the fluid and insert a small tube in the eardrum to allow continued drainage.   Because the middle ear fluid can become infected, it is important to watch for signs of an ear infection © 4378-6499 [employer] [address] [facility], [facility] [place]. All rights reserved. This information is not intended as a substitute for professional medical care. Always follow your healthcare professional's instructions.             The

## 2018-03-03 NOTE — PATIENT INSTRUCTIONS
Viral or Bacterial Bronchitis with Wheezing (Adult)    Bronchitis is an infection of the air passages. It often occurs during a cold and is usually caused by a virus. Symptoms include cough with mucus (phlegm) and low-grade fever.  This illness is contagi · Over-the-counter cough, cold, and sore-throat medicines will not shorten the length of the illness, but they may be helpful to reduce symptoms.  (Note: Do not use decongestants if you have high blood pressure.)  · If you were given an inhaler, use it exac Sinusitis can often be managed with self-care. Self-care can keep sinuses moist and make you feel more comfortable. Remember to follow your doctor's instructions closely. This can make a big difference in getting your sinus problem under control.   Drink fl Earaches can happen without an infection. This occurs when air and fluid build up behind the eardrum causing a feeling of fullness and discomfort and reduced hearing. This is called otitis media with effusion (OME) or serous otitis media.  It means there is · You may use over-the-counter decongestants such as phenylephrine or pseudoephedrine. But they are not always helpful. Don't use nasal spray decongestants more than 3 days. Longer use can make congestion worse.  Prescription nasal sprays from your doctor d

## 2018-04-06 ENCOUNTER — HOSPITAL ENCOUNTER (OUTPATIENT)
Age: 53
Discharge: HOME | End: 2018-04-06
Attending: EMERGENCY MEDICINE
Payer: OTHER MISCELLANEOUS

## 2018-04-06 VITALS
RESPIRATION RATE: 16 BRPM | TEMPERATURE: 98 F | WEIGHT: 162.06 LBS | OXYGEN SATURATION: 99 % | BODY MASS INDEX: 30 KG/M2 | DIASTOLIC BLOOD PRESSURE: 98 MMHG | SYSTOLIC BLOOD PRESSURE: 156 MMHG | HEART RATE: 76 BPM

## 2018-04-06 DIAGNOSIS — S05.8X1A EYE ABRASION, RIGHT, INITIAL ENCOUNTER: Primary | ICD-10-CM

## 2018-04-06 PROCEDURE — 99213 OFFICE O/P EST LOW 20 MIN: CPT

## 2018-04-06 RX ORDER — KETOROLAC TROMETHAMINE 4 MG/ML
1 SOLUTION/ DROPS OPHTHALMIC 4 TIMES DAILY
Qty: 1 BOTTLE | Refills: 0 | Status: SHIPPED | OUTPATIENT
Start: 2018-04-06 | End: 2018-05-01

## 2018-04-06 RX ORDER — TOBRAMYCIN 3 MG/ML
1 SOLUTION/ DROPS OPHTHALMIC
Qty: 1 BOTTLE | Refills: 0 | Status: SHIPPED | OUTPATIENT
Start: 2018-04-06 | End: 2018-04-11

## 2018-04-06 RX ORDER — TETRACAINE HYDROCHLORIDE 5 MG/ML
2 SOLUTION OPHTHALMIC ONCE
Status: COMPLETED | OUTPATIENT
Start: 2018-04-06 | End: 2018-04-06

## 2018-04-07 NOTE — ED PROVIDER NOTES
Patient Seen in: [facility]    History   Patient presents with:   Eye Visual Problem (opthalmic)    Stated Complaint: right eye redness     HPI    46-year-old female presents to the emergency department stating that she reac acuity was done per nursing notes. There is no photophobia. No purulent drainage. Anterior chamber is clear.     ED Course   Labs Reviewed - No data to display    ED Course as of Apr 06 2036  ------------------------------------------------------------

## 2018-05-01 PROCEDURE — 88175 CYTOPATH C/V AUTO FLUID REDO: CPT | Performed by: OBSTETRICS & GYNECOLOGY

## 2018-05-22 ENCOUNTER — OFFICE VISIT (OUTPATIENT)
Dept: FAMILY MEDICINE CLINIC | Facility: CLINIC | Age: 53
End: 2018-05-22

## 2018-05-22 VITALS
BODY MASS INDEX: 28.53 KG/M2 | TEMPERATURE: 99 F | RESPIRATION RATE: 16 BRPM | DIASTOLIC BLOOD PRESSURE: 62 MMHG | SYSTOLIC BLOOD PRESSURE: 118 MMHG | WEIGHT: 161 LBS | OXYGEN SATURATION: 99 % | HEART RATE: 84 BPM | HEIGHT: 63 IN

## 2018-05-22 DIAGNOSIS — R30.0 DYSURIA: Primary | ICD-10-CM

## 2018-05-22 PROCEDURE — 99213 OFFICE O/P EST LOW 20 MIN: CPT | Performed by: NURSE PRACTITIONER

## 2018-05-22 PROCEDURE — 81003 URINALYSIS AUTO W/O SCOPE: CPT | Performed by: NURSE PRACTITIONER

## 2018-05-22 RX ORDER — NITROFURANTOIN 25; 75 MG/1; MG/1
100 CAPSULE ORAL 2 TIMES DAILY
Qty: 14 CAPSULE | Refills: 0 | Status: SHIPPED | OUTPATIENT
Start: 2018-05-22 | End: 2018-05-29

## 2018-05-22 NOTE — PROGRESS NOTES
CHIEF COMPLAINT:   Patient presents with:  Urinary: lower back pain, dysuria x 1 dy       HPI:   Joesph Shah is a 48year old female who presents with symptoms of UTI. Complaining of urinary frequency, urgency, dysuria for last 1 days.  Symptoms ha wheezing  GI: See HPI. No N/V/C/D. : See HPI. NEURO: no headaches.     EXAM:   /76   Pulse 84   Temp 98.6 °F (37 °C) (Oral)   Resp 16   Ht 63\"   Wt 161 lb   LMP 05/11/2018 (Exact Date)   SpO2 99%   BMI 28.52 kg/m²   GENERAL: well developed, well Instructions   · Complete full course of antibiotics. · Rest and fluids  · Cranberry juice   · Over the counter Tylenol/Motrin as needed for pain/discomfort. · Follow up in 2-3 days if symptoms do not improve or worsen.     · Return to clinic or see your

## 2018-05-22 NOTE — PATIENT INSTRUCTIONS
· Complete full course of antibiotics. · Rest and fluids  · Cranberry juice   · Over the counter Tylenol/Motrin as needed for pain/discomfort. · Follow up in 2-3 days if symptoms do not improve or worsen.     · Return to clinic or see your primary care pr

## 2018-08-09 ENCOUNTER — OFFICE VISIT (OUTPATIENT)
Dept: FAMILY MEDICINE CLINIC | Facility: CLINIC | Age: 53
End: 2018-08-09
Payer: COMMERCIAL

## 2018-08-09 VITALS
BODY MASS INDEX: 29.06 KG/M2 | HEART RATE: 71 BPM | HEIGHT: 63 IN | DIASTOLIC BLOOD PRESSURE: 74 MMHG | SYSTOLIC BLOOD PRESSURE: 132 MMHG | OXYGEN SATURATION: 99 % | TEMPERATURE: 98 F | WEIGHT: 164 LBS

## 2018-08-09 DIAGNOSIS — J98.01 BRONCHOSPASM: ICD-10-CM
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
## 2018-08-09 PROCEDURE — 99213 OFFICE O/P EST LOW 20 MIN: CPT | Performed by: NURSE PRACTITIONER

## 2018-08-09 RX ORDER — PREDNISONE 20 MG/1
20 TABLET ORAL DAILY
Qty: 5 TABLET | Refills: 0 | Status: SHIPPED | OUTPATIENT
Start: 2018-08-09 | End: 2018-08-14

## 2018-08-09 RX ORDER — ALBUTEROL SULFATE 90 UG/1
2 AEROSOL, METERED RESPIRATORY (INHALATION) EVERY 6 HOURS PRN
Qty: 1 INHALER | Refills: 0 | Status: SHIPPED | OUTPATIENT
Start: 2018-08-09 | End: 2019-07-02

## 2018-08-09 NOTE — PROGRESS NOTES
CHIEF COMPLAINT:   Patient presents with:  Skin: has red bump on left side of face, has swelling in area, feels warm to touch, thinks it could be a insect bite, noticed 2 dys ago         HPI:   Damien Castillo is a 48year old female who presents for ev Disp: 1 Inhaler Rfl: 1   Nutritional Supplements (NUTRITIONAL SUPPLEMENT OR) Take by mouth daily. Disp:  Rfl:      No current facility-administered medications for this visit.     Past Medical History:   Diagnosis Date   • Bronchospasm    • Pneumonia Advised follow up for any worsening symptoms. Bronchospasm: refilled albuterol inhaler. Advised follow up with pcp for further refills. Provided with list of Radha Garcia PCP's.      Meds & Refills for this Visit:    Signed Prescriptions Disp Refills    mupir

## 2018-09-04 PROCEDURE — 88305 TISSUE EXAM BY PATHOLOGIST: CPT | Performed by: OBSTETRICS & GYNECOLOGY

## 2018-11-28 VITALS
RESPIRATION RATE: 20 BRPM | SYSTOLIC BLOOD PRESSURE: 128 MMHG | DIASTOLIC BLOOD PRESSURE: 80 MMHG | TEMPERATURE: 99.3 F | OXYGEN SATURATION: 98 % | HEART RATE: 84 BPM

## 2019-07-02 ENCOUNTER — OFFICE VISIT (OUTPATIENT)
Dept: FAMILY MEDICINE CLINIC | Facility: CLINIC | Age: 54
End: 2019-07-02
Payer: COMMERCIAL

## 2019-07-02 VITALS
HEIGHT: 62 IN | SYSTOLIC BLOOD PRESSURE: 139 MMHG | BODY MASS INDEX: 32.02 KG/M2 | DIASTOLIC BLOOD PRESSURE: 77 MMHG | OXYGEN SATURATION: 99 % | HEART RATE: 74 BPM | WEIGHT: 174 LBS | TEMPERATURE: 98 F | RESPIRATION RATE: 20 BRPM

## 2019-07-02 DIAGNOSIS — R21 RASH OF BODY: Primary | ICD-10-CM

## 2019-07-02 PROCEDURE — 99213 OFFICE O/P EST LOW 20 MIN: CPT | Performed by: NURSE PRACTITIONER

## 2019-07-02 RX ORDER — KETOCONAZOLE 20 MG/G
1 CREAM TOPICAL DAILY
Qty: 15 G | Refills: 1 | Status: SHIPPED | OUTPATIENT
Start: 2019-07-02 | End: 2021-07-15

## 2019-07-02 NOTE — PROGRESS NOTES
CHIEF COMPLAINT:   Patient presents with:  Rash: rash on neck, rt armpit, and lower lt stomach, itchy, x 3 wks          HPI:    Tiffani Myrick is a 48year old female who presents for evaluation of a rash. Per patient rash started in the past 3 weeks. • Bronchospasm    • Pneumonia       Past Surgical History:   Procedure Laterality Date   • CHOLECYSTECTOMY      1994      Family History   Problem Relation Age of Onset   • Heart Disease Father       Social History    Tobacco Use      Smoking status: Never JOINTS: normal ROM of extrmities  LYMPH: No cervical lymphadenopathy. ASSESSMENT AND PLAN:   Milton Coates is a 48year old female who presents for evaluation of a rash.  Findings are consistent with:    ASSESSMENT:  Rash of body  (primary encounte Atopic dermatitis is a dry, itchy, red rash. It’s also called eczema. The rash is chronic, or ongoing. It can come and go over time. The disease is often passed down in families.  It causes a problem with the skin barrier that makes the skin more sensitive See your healthcare provider, or as advised. If your symptoms don’t get better or if they get worse in the next 7 days, make an appointment with your healthcare provider.   When to seek medical advice  Call your healthcare provider right away  if any of the

## 2019-07-02 NOTE — PATIENT INSTRUCTIONS
Triamcinolone cream as prescribed  If not improving start ketoconazole cream to areas  Continue antihistamine  Cool compresses    Gentle soap  Avoid egg whites to check if allergy.       Follow-up if not improving       Atopic Dermatitis (Adult)  Atopic mio use loratadine. This is an antihistamine that will not make you sleepy. Do not use diphenhydramine if you have glaucoma or have trouble urinating due to an enlarged prostate. Follow-up care  See your healthcare provider, or as advised.  If your symptoms do

## 2019-07-16 ENCOUNTER — OFFICE VISIT (OUTPATIENT)
Dept: INTERNAL MEDICINE CLINIC | Facility: CLINIC | Age: 54
End: 2019-07-16
Payer: COMMERCIAL

## 2019-07-16 VITALS
HEART RATE: 92 BPM | BODY MASS INDEX: 31.1 KG/M2 | RESPIRATION RATE: 18 BRPM | DIASTOLIC BLOOD PRESSURE: 80 MMHG | SYSTOLIC BLOOD PRESSURE: 130 MMHG | TEMPERATURE: 100 F | OXYGEN SATURATION: 99 % | HEIGHT: 62 IN | WEIGHT: 169 LBS

## 2019-07-16 DIAGNOSIS — L30.9 DERMATITIS: Primary | ICD-10-CM

## 2019-07-16 PROCEDURE — 99213 OFFICE O/P EST LOW 20 MIN: CPT | Performed by: NURSE PRACTITIONER

## 2019-07-16 NOTE — PROGRESS NOTES
HPI:    Patient ID: Melissa Porras is a 48year old female. Patient presents with:  New Patient: rash around neck, back of knee, left hip      Rash   This is a new problem. The current episode started 1 to 4 weeks ago. The problem is unchanged.  The partner    Other Topics      Concerns:         Current Outpatient Medications:  hydrocortisone 2.5 % External Cream Apply 1 Application topically 2 (two) times daily.  Disp: 1 Tube Rfl: 1   triamcinolone acetonide 0.1 % External Cream Apply topically 2 (two RDW 14.3 09/04/2018     09/04/2018     No results found for: CHOLEST, TRIG, HDL, LDL, VLDL, TCHDLRATIO, NONHDLC, CHOLHDLRATIO, NONHDLC, CALCNONHDL  Lab Results   Component Value Date     09/04/2018    A1C 5.3 09/04/2018     Lab Results   C

## 2019-12-05 ENCOUNTER — HOSPITAL ENCOUNTER (OUTPATIENT)
Age: 54
Discharge: STILL ADMITTED OR ADMINISTRATIVE | End: 2019-12-05
Payer: COMMERCIAL

## 2020-02-08 ENCOUNTER — OFFICE VISIT (OUTPATIENT)
Dept: FAMILY MEDICINE CLINIC | Facility: CLINIC | Age: 55
End: 2020-02-08

## 2020-02-08 VITALS
HEIGHT: 62 IN | SYSTOLIC BLOOD PRESSURE: 136 MMHG | TEMPERATURE: 99 F | DIASTOLIC BLOOD PRESSURE: 80 MMHG | BODY MASS INDEX: 30.18 KG/M2 | OXYGEN SATURATION: 100 % | HEART RATE: 101 BPM | WEIGHT: 164 LBS

## 2020-02-08 DIAGNOSIS — N30.00 ACUTE CYSTITIS WITHOUT HEMATURIA: Primary | ICD-10-CM

## 2020-02-08 DIAGNOSIS — R39.9 UTI SYMPTOMS: ICD-10-CM

## 2020-02-08 LAB
BILIRUBIN: NEGATIVE
GLUCOSE (URINE DIPSTICK): NEGATIVE MG/DL
KETONES (URINE DIPSTICK): NEGATIVE MG/DL
MULTISTIX LOT#: ABNORMAL NUMERIC
NITRITE, URINE: NEGATIVE
PH, URINE: 6 (ref 4.5–8)
PROTEIN (URINE DIPSTICK): 300 MG/DL
SPECIFIC GRAVITY: 1.02 (ref 1–1.03)
URINE-COLOR: YELLOW
UROBILINOGEN,SEMI-QN: 0.2 MG/DL (ref 0–1.9)

## 2020-02-08 PROCEDURE — 81003 URINALYSIS AUTO W/O SCOPE: CPT | Performed by: FAMILY MEDICINE

## 2020-02-08 PROCEDURE — 99213 OFFICE O/P EST LOW 20 MIN: CPT | Performed by: FAMILY MEDICINE

## 2020-02-08 RX ORDER — NITROFURANTOIN 25; 75 MG/1; MG/1
100 CAPSULE ORAL 2 TIMES DAILY
Qty: 14 CAPSULE | Refills: 0 | Status: SHIPPED | OUTPATIENT
Start: 2020-02-08 | End: 2020-02-15

## 2020-02-08 NOTE — PROGRESS NOTES
Patient presents with:  UTI: urgency, pain at the end of urinating, x 5 days. HPI:   Patient presents with symptoms of UTI for 5 days. She states she waited because she doesn't have insurance and didn't want to take abx.    Complaining of urinary ema Inhalation Aero Soln, Inhale 2 puffs into the lungs every 4 (four) hours as needed for Wheezing. (Patient not taking: Reported on 2/8/2020 ), Disp: 1 Inhaler, Rfl: 1    No current facility-administered medications for this visit.       Past Medical History: 10/31/20 Date       ASSESSMENT AND PLAN:     Acute cystitis without hematuria  (primary encounter diagnosis)  Uti symptoms    Orders Placed This Encounter      Urine Dip, auto without Micro    Plan: meds as below.  Advised pt to work on consistent fluid int

## 2020-02-08 NOTE — PATIENT INSTRUCTIONS
Take antibiotics with food and plenty of water. Eat yogurt daily or use probiotics. (Polo Sims is a good example of an OTC probiotic)  Make sure to finish the entire antibiotic treatment. You may take otc pyridium for urinary discomfort if needed.    Increase

## 2020-04-13 ENCOUNTER — TELEPHONE (OUTPATIENT)
Dept: INTERNAL MEDICINE CLINIC | Facility: CLINIC | Age: 55
End: 2020-04-13

## 2020-04-13 DIAGNOSIS — Z91.048 ALLERGY TO MOLD: Primary | ICD-10-CM

## 2020-04-13 DIAGNOSIS — J98.01 BRONCHOSPASM: ICD-10-CM

## 2020-04-13 DIAGNOSIS — R05.9 COUGH: ICD-10-CM

## 2020-04-13 RX ORDER — ALBUTEROL SULFATE 90 UG/1
2 AEROSOL, METERED RESPIRATORY (INHALATION) EVERY 4 HOURS PRN
Qty: 1 INHALER | Refills: 0 | Status: CANCELLED | OUTPATIENT
Start: 2020-04-13

## 2020-04-13 NOTE — TELEPHONE ENCOUNTER
Fax request from [address] [place] [address] requesting a refill on Albuterol Sulfate HFA (PROAIR HFA) 108 (90 Base) MCG/ACT Inhalation Aero Soln. Fax in triage.

## 2020-04-15 RX ORDER — ALBUTEROL SULFATE 90 UG/1
2 AEROSOL, METERED RESPIRATORY (INHALATION) EVERY 6 HOURS PRN
Qty: 1 INHALER | Refills: 1 | Status: SHIPPED | OUTPATIENT
Start: 2020-04-15

## 2020-04-15 NOTE — TELEPHONE ENCOUNTER
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 She usually has one on hand for this and it has . Ok per Dr. Santana Cabot to refill.      Pt aware and would like generic sent as she does not have ins

## 2020-07-01 ENCOUNTER — TELEPHONE (OUTPATIENT)
Dept: INTERNAL MEDICINE CLINIC | Facility: CLINIC | Age: 55
End: 2020-07-01

## 2020-07-01 DIAGNOSIS — Z91.048 ALLERGY TO MOLD: Primary | ICD-10-CM

## 2020-07-01 DIAGNOSIS — J98.01 BRONCHOSPASM: ICD-10-CM

## 2020-07-01 RX ORDER — ALBUTEROL SULFATE 2.5 MG/3ML
2.5 SOLUTION RESPIRATORY (INHALATION) EVERY 6 HOURS PRN
Qty: 120 CONTAINER | Refills: 2 | Status: SHIPPED | OUTPATIENT
Start: 2020-07-01 | End: 2021-07-07

## 2020-07-01 NOTE — TELEPHONE ENCOUNTER
Patient called and stated she lost her job, lives in a rented place that has black mold; she is in need of liquid caps that you place in the nebulizer machine. Please send to [facility] in [place], [address] on file.

## 2020-09-16 ENCOUNTER — TELEPHONE (OUTPATIENT)
Dept: INTERNAL MEDICINE CLINIC | Facility: CLINIC | Age: 55
End: 2020-09-16

## 2020-09-16 NOTE — TELEPHONE ENCOUNTER
Fax from [employer] on behalf of [employer] requesting records from the past 5 years. Patient only seen once in our office, OV notes from [address] [place] [address] Drive 7/16/19 were printed out and faxed to #[phone].

## 2021-03-12 ENCOUNTER — TELEPHONE (OUTPATIENT)
Dept: INTERNAL MEDICINE CLINIC | Facility: CLINIC | Age: 56
End: 2021-03-12

## 2021-03-12 DIAGNOSIS — Z91.048 ALLERGY TO MOLD: Primary | ICD-10-CM

## 2021-03-12 DIAGNOSIS — R05.9 COUGH: ICD-10-CM

## 2021-03-12 RX ORDER — PREDNISONE 20 MG/1
TABLET ORAL
Qty: 10 TABLET | Refills: 0 | Status: SHIPPED | OUTPATIENT
Start: 2021-03-12 | End: 2021-03-12

## 2021-03-12 RX ORDER — PREDNISONE 20 MG/1
TABLET ORAL
Qty: 10 TABLET | Refills: 0 | Status: SHIPPED | OUTPATIENT
Start: 2021-03-12 | End: 2021-07-05

## 2021-03-12 NOTE — TELEPHONE ENCOUNTER
Pt c/o a dry cough and bronchiospasm that is not relieved by inhaler, nebulizer, oregano cap, elderberry or daily allergy pill Xilytol. Pt denies SOB, wheezing, chest pain, fever, post-nasal drop or nasal congestion. Per Herlinda Everett, pt to take Pred 40mg parish

## 2021-03-12 NOTE — TELEPHONE ENCOUNTER
Patient called and stated she takes inhaler when it is damp outside, and has this cough due to the weather. This is not COVID-19 because she had it before.  She does not have any other symptoms and would like to be prescribed a short dose of Prednisone to O

## 2021-07-05 ENCOUNTER — MOBILE ENCOUNTER (OUTPATIENT)
Dept: INTERNAL MEDICINE CLINIC | Facility: CLINIC | Age: 56
End: 2021-07-05

## 2021-07-05 DIAGNOSIS — Z91.048 ALLERGY TO MOLD: ICD-10-CM

## 2021-07-05 DIAGNOSIS — R05.9 COUGH: ICD-10-CM

## 2021-07-05 RX ORDER — PREDNISONE 20 MG/1
TABLET ORAL
Qty: 10 TABLET | Refills: 0 | Status: SHIPPED | OUTPATIENT
Start: 2021-07-05 | End: 2021-07-15

## 2021-07-07 ENCOUNTER — TELEPHONE (OUTPATIENT)
Dept: INTERNAL MEDICINE CLINIC | Facility: CLINIC | Age: 56
End: 2021-07-07

## 2021-07-07 ENCOUNTER — TELEMEDICINE (OUTPATIENT)
Dept: INTERNAL MEDICINE CLINIC | Facility: CLINIC | Age: 56
End: 2021-07-07
Payer: COMMERCIAL

## 2021-07-07 DIAGNOSIS — R05.9 COUGH: Primary | ICD-10-CM

## 2021-07-07 DIAGNOSIS — Z91.048 ALLERGY TO MOLD: ICD-10-CM

## 2021-07-07 DIAGNOSIS — J98.01 BRONCHOSPASM: ICD-10-CM

## 2021-07-07 PROCEDURE — 99214 OFFICE O/P EST MOD 30 MIN: CPT | Performed by: PHYSICIAN ASSISTANT

## 2021-07-07 RX ORDER — ALBUTEROL SULFATE 2.5 MG/3ML
2.5 SOLUTION RESPIRATORY (INHALATION) EVERY 6 HOURS PRN
Qty: 120 ML | Refills: 0 | Status: SHIPPED | OUTPATIENT
Start: 2021-07-07 | End: 2021-07-15

## 2021-07-07 RX ORDER — FLUTICASONE PROPIONATE AND SALMETEROL 100; 50 UG/1; UG/1
1 POWDER RESPIRATORY (INHALATION) 2 TIMES DAILY
Qty: 1 EACH | Refills: 0 | Status: SHIPPED | OUTPATIENT
Start: 2021-07-07 | End: 2021-07-15

## 2021-07-07 RX ORDER — BENZONATATE 200 MG/1
200 CAPSULE ORAL 3 TIMES DAILY PRN
Qty: 20 CAPSULE | Refills: 0 | Status: SHIPPED | OUTPATIENT
Start: 2021-07-07 | End: 2021-07-15

## 2021-07-07 RX ORDER — AZITHROMYCIN 250 MG/1
TABLET, FILM COATED ORAL
Qty: 6 TABLET | Refills: 0 | Status: SHIPPED | OUTPATIENT
Start: 2021-07-07 | End: 2021-07-12

## 2021-07-07 NOTE — TELEPHONE ENCOUNTER
Patient is still having a terrible cough and she needs to know what next step is. She was here last week and it has not gotten better. She also is running a 100.5 temp.

## 2021-07-07 NOTE — PROGRESS NOTES
Lona Giles is a 49year old female. HPI:   This visit is conducted using Telemedicine with live, interactive video and audio.      Patient consents to video visit today to discuss ongoing cough  Paged on 7/5 complaining of cough that had started af every 7 days. (Patient not taking: Reported on 2/8/2020 ) 12 capsule 0   • Levocetirizine Dihydrochloride (XYZAL) 2.5 MG/5ML Oral Solution Take 2.5 mg by mouth every evening. • Black Elderberry,Berry-Flower, 575 MG Oral Cap Take by mouth.      • Oregano daily for 1 day, THEN 1 tablet (250 mg total) daily for 4 days. • benzonatate 200 MG Oral Cap 20 capsule 0     Sig: Take 1 capsule (200 mg total) by mouth 3 (three) times daily as needed for cough.    • fluticasone-salmeterol (ADVAIR DISKUS) 100-50 MCG/DO

## 2021-07-07 NOTE — TELEPHONE ENCOUNTER
Pt reports having spoke in with Kelley Engle on 7/5/2021 and started prednisone and has completed 3/5 days and has had no improvements. Reports not using albuterol d/t \"throat dry\"   Has not used any medication at home to manage low grade temperature or cough.

## 2021-07-12 ENCOUNTER — TELEPHONE (OUTPATIENT)
Dept: INTERNAL MEDICINE CLINIC | Facility: CLINIC | Age: 56
End: 2021-07-12

## 2021-07-12 NOTE — TELEPHONE ENCOUNTER
Patient called in stating that she has spoken to her pharmacist regarding Sherry Corcoranstaceywilmar. Patient stated that pharmacist stated that Espinal Males is something she should be on long term due to her ongoing issues with allergies.  Patient inquiring if this medication can b

## 2021-07-12 NOTE — TELEPHONE ENCOUNTER
Pt states pharmacist told her she should be on Advair daily d/t her symptoms. Per Vanesa Leggett we can prescribe Advair when she has symptoms but not on a daily basis unless she has a true diagnosis of asthma.  Pt was advised to complete a PFT when she returns to b

## 2021-07-13 ENCOUNTER — TELEPHONE (OUTPATIENT)
Dept: INTERNAL MEDICINE CLINIC | Facility: CLINIC | Age: 56
End: 2021-07-13

## 2021-07-13 DIAGNOSIS — J98.01 BRONCHOSPASM: ICD-10-CM

## 2021-07-13 DIAGNOSIS — Z91.048 ALLERGY TO MOLD: Primary | ICD-10-CM

## 2021-07-13 DIAGNOSIS — R05.9 COUGH: ICD-10-CM

## 2021-07-13 NOTE — TELEPHONE ENCOUNTER
Req for Pulmonary Function Test    Pt is greatly concerned about her out of pocket test.     She was told that a complete Bronchial test would cost her $2767 as an estimate from [facility] per Leeanna.  This was not an estimate based on insurance cov

## 2021-07-13 NOTE — TELEPHONE ENCOUNTER
Pt declines following completing PFT testing at this time d/t cost.   Virtual appt made to discuss treatment option with Meagan Moscoso MD   Pt agrees to time and date of call

## 2021-07-15 ENCOUNTER — TELEMEDICINE (OUTPATIENT)
Dept: INTERNAL MEDICINE CLINIC | Facility: CLINIC | Age: 56
End: 2021-07-15
Payer: COMMERCIAL

## 2021-07-15 DIAGNOSIS — Z12.11 COLON CANCER SCREENING: ICD-10-CM

## 2021-07-15 DIAGNOSIS — R05.3 CHRONIC COUGH: Primary | ICD-10-CM

## 2021-07-15 PROBLEM — Z28.21 COVID-19 VACCINATION DECLINED: Status: ACTIVE | Noted: 2021-07-15

## 2021-07-15 PROCEDURE — 99214 OFFICE O/P EST MOD 30 MIN: CPT | Performed by: INTERNAL MEDICINE

## 2021-07-15 RX ORDER — FLUTICASONE PROPIONATE AND SALMETEROL 100; 50 UG/1; UG/1
1 POWDER RESPIRATORY (INHALATION) 2 TIMES DAILY
Qty: 1 EACH | Refills: 0 | Status: SHIPPED | OUTPATIENT
Start: 2021-07-15

## 2021-07-15 NOTE — PATIENT INSTRUCTIONS
Chronic Cough with Uncertain Cause (Adult)  Everyone has had a cough as part of the common cold, flu, or bronchitis. This kind of cough occurs along with an achy feeling, low-grade fever, nasal and sinus congestion, and a scratchy or sore throat.  This us atenolol, metoprolol, nadolol, and others. Let your healthcare provider know if you are taking any of these. The chronic cough may mean your medicine needs to be changed. Asthma  Cough may be the only sign of mild asthma.  You may have tests to find out Coughing up blood  · Moderate to severe trouble breathing or wheezing  Tammy last reviewed this educational content on 6/1/2018  © 3212-0391 [employer]. All rights reserved.  This information is not intended as a substitute for professiona

## 2021-07-15 NOTE — PROGRESS NOTES
HPI/Subjective:   Patient ID: Severiano Cabrera is a 49year old female. This visit is conducted using Telemedicine with live, interactive video and audio      Cough  This is a recurrent problem. The current episode started more than 1 month ago.  The pro Oral Cap Take by mouth. • Fluticasone Propionate 50 MCG/ACT Nasal Suspension by Each Nare route daily. • Nutritional Supplements (NUTRITIONAL SUPPLEMENT OR) Take by mouth daily.          Allergies:  Clindamycin             SHORTNESS OF BREATH  Codei

## 2022-07-15 ENCOUNTER — OFFICE VISIT (OUTPATIENT)
Dept: FAMILY MEDICINE CLINIC | Facility: CLINIC | Age: 57
End: 2022-07-15
Payer: COMMERCIAL

## 2022-07-15 VITALS
RESPIRATION RATE: 18 BRPM | TEMPERATURE: 98 F | BODY MASS INDEX: 30.36 KG/M2 | SYSTOLIC BLOOD PRESSURE: 154 MMHG | HEART RATE: 85 BPM | HEIGHT: 62 IN | DIASTOLIC BLOOD PRESSURE: 92 MMHG | OXYGEN SATURATION: 99 % | WEIGHT: 165 LBS

## 2022-07-15 DIAGNOSIS — H00.014 HORDEOLUM EXTERNUM OF LEFT UPPER EYELID: Primary | ICD-10-CM

## 2022-07-15 PROCEDURE — 3008F BODY MASS INDEX DOCD: CPT | Performed by: NURSE PRACTITIONER

## 2022-07-15 PROCEDURE — 99213 OFFICE O/P EST LOW 20 MIN: CPT | Performed by: NURSE PRACTITIONER

## 2022-07-15 PROCEDURE — 3077F SYST BP >= 140 MM HG: CPT | Performed by: NURSE PRACTITIONER

## 2022-07-15 PROCEDURE — 3080F DIAST BP >= 90 MM HG: CPT | Performed by: NURSE PRACTITIONER

## 2022-07-15 RX ORDER — ERYTHROMYCIN 5 MG/G
1 OINTMENT OPHTHALMIC 3 TIMES DAILY
Qty: 1 G | Refills: 0 | Status: SHIPPED | OUTPATIENT
Start: 2022-07-15 | End: 2022-07-22

## 2022-07-16 ENCOUNTER — TELEPHONE (OUTPATIENT)
Dept: INTERNAL MEDICINE CLINIC | Facility: CLINIC | Age: 57
End: 2022-07-16

## 2022-07-16 DIAGNOSIS — H00.014 HORDEOLUM EXTERNUM OF LEFT UPPER EYELID: Primary | ICD-10-CM

## 2022-07-16 RX ORDER — PREDNISONE 20 MG/1
TABLET ORAL
Qty: 10 TABLET | Refills: 0 | Status: SHIPPED | OUTPATIENT
Start: 2022-07-16

## 2022-07-17 ENCOUNTER — HOSPITAL ENCOUNTER (OUTPATIENT)
Age: 57
Discharge: HOME | End: 2022-07-17
Payer: COMMERCIAL

## 2022-07-17 ENCOUNTER — MOBILE ENCOUNTER (OUTPATIENT)
Dept: INTERNAL MEDICINE CLINIC | Facility: CLINIC | Age: 57
End: 2022-07-17

## 2022-07-17 VITALS
SYSTOLIC BLOOD PRESSURE: 146 MMHG | BODY MASS INDEX: 29.23 KG/M2 | DIASTOLIC BLOOD PRESSURE: 84 MMHG | HEIGHT: 63 IN | RESPIRATION RATE: 20 BRPM | HEART RATE: 87 BPM | WEIGHT: 165 LBS | TEMPERATURE: 99 F | OXYGEN SATURATION: 99 %

## 2022-07-17 DIAGNOSIS — R03.0 ELEVATED BLOOD-PRESSURE READING, WITHOUT DIAGNOSIS OF HYPERTENSION: ICD-10-CM

## 2022-07-17 DIAGNOSIS — H02.846 SWELLING OF EYELID, LEFT: ICD-10-CM

## 2022-07-17 DIAGNOSIS — S00.86XA INSECT BITE OF FACE, INITIAL ENCOUNTER: Primary | ICD-10-CM
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
## 2022-07-17 RX ORDER — FAMOTIDINE 20 MG/1
20 TABLET, FILM COATED ORAL ONCE
Status: COMPLETED | OUTPATIENT
Start: 2022-07-17 | End: 2022-07-17

## 2022-07-17 RX ORDER — DEXAMETHASONE 4 MG/1
16 TABLET ORAL ONCE
Status: COMPLETED | OUTPATIENT
Start: 2022-07-17 | End: 2022-07-17

## 2022-07-17 RX ORDER — OFLOXACIN 3 MG/ML
1 SOLUTION/ DROPS OPHTHALMIC 4 TIMES DAILY
Qty: 1 EACH | Refills: 0 | Status: SHIPPED | OUTPATIENT
Start: 2022-07-17

## 2022-07-17 RX ORDER — DOXYCYCLINE HYCLATE 100 MG/1
100 CAPSULE ORAL 2 TIMES DAILY
Qty: 20 CAPSULE | Refills: 0 | Status: SHIPPED | OUTPATIENT
Start: 2022-07-17 | End: 2022-07-27

## 2022-07-17 NOTE — PROGRESS NOTES
Pt pages with complaint of worsening periorbital swelling despite start of abx and abx ointment given at urgent care/eye doctor per pt  Pt referred to be evaluated at [facility] to determine if she has periorbital cellulitis

## 2023-06-29 DIAGNOSIS — Z91.048 ALLERGY TO MOLD: ICD-10-CM

## 2023-06-29 DIAGNOSIS — J98.01 BRONCHOSPASM: ICD-10-CM

## 2023-06-29 RX ORDER — ALBUTEROL SULFATE 90 UG/1
2 AEROSOL, METERED RESPIRATORY (INHALATION) EVERY 6 HOURS PRN
Qty: 1 EACH | Refills: 1 | Status: SHIPPED | OUTPATIENT
Start: 2023-06-29

## 2023-12-13 ENCOUNTER — TELEPHONE (OUTPATIENT)
Dept: INTERNAL MEDICINE CLINIC | Facility: CLINIC | Age: 58
End: 2023-12-13

## 2023-12-19 NOTE — TELEPHONE ENCOUNTER
Spoke w/ pt and verified yes she is still under the care of Dr. Teto Escobar as her primary. She is not able to schedule now due to being at work. Also pt wanted to make clear she will not be getting the pneumonia and covid vaccines. If this is an issue with Dr. Teto Escobar to the vaccines, she will seek care with another provider.
